# Patient Record
Sex: FEMALE | Race: WHITE
[De-identification: names, ages, dates, MRNs, and addresses within clinical notes are randomized per-mention and may not be internally consistent; named-entity substitution may affect disease eponyms.]

---

## 2018-02-24 ENCOUNTER — HOSPITAL ENCOUNTER (EMERGENCY)
Dept: HOSPITAL 54 - ER | Age: 58
Discharge: HOME | End: 2018-02-24
Payer: COMMERCIAL

## 2018-02-24 VITALS — HEIGHT: 61 IN | BODY MASS INDEX: 25.11 KG/M2 | WEIGHT: 133 LBS

## 2018-02-24 VITALS — DIASTOLIC BLOOD PRESSURE: 84 MMHG | SYSTOLIC BLOOD PRESSURE: 133 MMHG

## 2018-02-24 DIAGNOSIS — L03.115: Primary | ICD-10-CM

## 2018-02-24 PROCEDURE — Z7610: HCPCS

## 2018-02-24 PROCEDURE — 93971 EXTREMITY STUDY: CPT

## 2018-02-24 PROCEDURE — A4606 OXYGEN PROBE USED W OXIMETER: HCPCS

## 2018-02-24 PROCEDURE — 99284 EMERGENCY DEPT VISIT MOD MDM: CPT

## 2019-04-28 ENCOUNTER — HOSPITAL ENCOUNTER (EMERGENCY)
Dept: HOSPITAL 91 - E/R | Age: 59
LOS: 1 days | Discharge: HOME | End: 2019-04-29
Payer: COMMERCIAL

## 2019-04-28 ENCOUNTER — HOSPITAL ENCOUNTER (EMERGENCY)
Dept: HOSPITAL 10 - E/R | Age: 59
LOS: 1 days | Discharge: HOME | End: 2019-04-29
Payer: COMMERCIAL

## 2019-04-28 VITALS
BODY MASS INDEX: 29.65 KG/M2 | WEIGHT: 151.02 LBS | WEIGHT: 151.02 LBS | HEIGHT: 60 IN | HEIGHT: 60 IN | BODY MASS INDEX: 29.65 KG/M2

## 2019-04-28 DIAGNOSIS — N64.4: Primary | ICD-10-CM

## 2019-04-28 PROCEDURE — 76642 ULTRASOUND BREAST LIMITED: CPT

## 2019-04-28 PROCEDURE — 99284 EMERGENCY DEPT VISIT MOD MDM: CPT

## 2019-04-29 NOTE — ERD
ER Documentation


Chief Complaint


Chief Complaint





R breast pain x 1 month; got worse today; pt healthy per daughter





HPI


This is a 59-year-old female who presents for about 1 month right-sided breast 


pain, mainly inferior to her axilla.  Pain is worsened by movement, as well as 


wearing a bra.  Brought in by family, that they felt a lump over the area.  


Patient has not had any weight loss, no drainage from her breast.  No fever





ROS


All systems reviewed and are negative except as per history of present illness.





Allergies


Allergies:  


Coded Allergies:  


     No Known Allergy (Unverified , 4/29/19)





PMhx/Soc


Medical and Surgical Hx:  pt denies Medical Hx, pt denies Surgical Hx


Hx Alcohol Use:  No


Hx Substance Use:  No


Hx Tobacco Use:  No


Smoking Status:  Never smoker





Physical Exam


Vitals





Vital Signs


  Date      Temp  Pulse  Resp  B/P (MAP)   Pulse Ox  O2          O2 Flow    FiO2


Time                                                 Delivery    Rate


   4/28/19  98.0     64    18      144/67       100


     22:44                           (92)





Physical Exam


Const: Afebrile, nontoxic


Head: Atraumatic


Eyes: Normal conjunctiva


ENT: Normal external ears, nose and mouth. Neck:


Resp: Normal respiratory effort


Cardio:


Breast exam: Performed chaperone Opal, there is no breast drainage, there is 


no overlying erythema, there are no palpable masses, no flexion


Abd: 


Skin: 


Back: 


Ext: 


Neur: Awake and alert


Psych: Normal mood and affect





Procedures/MDM


This is a 59-year female presents for evaluation of right-sided breast pain.  On


exam I do not appreciate any palpable masses, her ultrasound was negative, she 


has no infectious signs.  An extensive discussion with the patient and family, 


regarding follow-up, she will need further evaluation with her PMD, given the 


limited scope with ultrasound, if malignancy is a concern, patient and family 


agreed to discharge plan of care, at discharge patient was in no distress.





Departure


Diagnosis:  


   Primary Impression:  


   Breast pain


Condition:  Stable











BIA IRBY MD               Apr 29, 2019 01:12